# Patient Record
Sex: FEMALE | Race: OTHER | HISPANIC OR LATINO | Employment: UNEMPLOYED | URBAN - METROPOLITAN AREA
[De-identification: names, ages, dates, MRNs, and addresses within clinical notes are randomized per-mention and may not be internally consistent; named-entity substitution may affect disease eponyms.]

---

## 2020-09-05 ENCOUNTER — HOSPITAL ENCOUNTER (EMERGENCY)
Facility: HOSPITAL | Age: 25
Discharge: HOME | End: 2020-09-05
Attending: EMERGENCY MEDICINE | Admitting: EMERGENCY MEDICINE

## 2020-09-05 VITALS
SYSTOLIC BLOOD PRESSURE: 114 MMHG | OXYGEN SATURATION: 99 % | DIASTOLIC BLOOD PRESSURE: 80 MMHG | TEMPERATURE: 98.7 F | RESPIRATION RATE: 20 BRPM | HEART RATE: 102 BPM
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
## 2020-09-05 DIAGNOSIS — N73.9 PELVIC INFLAMMATORY DISEASE: Primary | ICD-10-CM
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
## 2020-09-05 LAB
BILIRUB UR QL STRIP: NEGATIVE
CLARITY UR: CLEAR
COLOR UR: YELLOW
EXT PREG TEST URINE: NEGATIVE
EXT. CONTROL ED NAV: NORMAL
GLUCOSE UR STRIP-MCNC: NEGATIVE MG/DL
HGB UR QL STRIP.AUTO: NEGATIVE
KETONES UR STRIP-MCNC: NEGATIVE MG/DL
LEUKOCYTE ESTERASE UR QL STRIP: NEGATIVE
NITRITE UR QL STRIP: NEGATIVE
PH UR STRIP.AUTO: 6 [PH]
PROT UR STRIP-MCNC: NEGATIVE MG/DL
SP GR UR STRIP.AUTO: 1.02 (ref 1–1.04)
UROBILINOGEN UA: NEGATIVE MG/DL

## 2020-09-05 PROCEDURE — 99284 EMERGENCY DEPT VISIT MOD MDM: CPT

## 2020-09-05 PROCEDURE — 81025 URINE PREGNANCY TEST: CPT | Performed by: PHYSICIAN ASSISTANT

## 2020-09-05 PROCEDURE — 87591 N.GONORRHOEAE DNA AMP PROB: CPT | Performed by: PHYSICIAN ASSISTANT

## 2020-09-05 PROCEDURE — 87491 CHLMYD TRACH DNA AMP PROBE: CPT | Performed by: PHYSICIAN ASSISTANT

## 2020-09-05 PROCEDURE — 99284 EMERGENCY DEPT VISIT MOD MDM: CPT | Performed by: PHYSICIAN ASSISTANT

## 2020-09-05 PROCEDURE — 81003 URINALYSIS AUTO W/O SCOPE: CPT | Performed by: PHYSICIAN ASSISTANT

## 2020-09-05 RX ORDER — AMOXICILLIN AND CLAVULANATE POTASSIUM 875; 125 MG/1; MG/1
1 TABLET, FILM COATED ORAL ONCE
Status: COMPLETED | OUTPATIENT
Start: 2020-09-05 | End: 2020-09-05

## 2020-09-05 RX ORDER — GINSENG 100 MG
1 CAPSULE ORAL ONCE
Status: COMPLETED | OUTPATIENT
Start: 2020-09-05 | End: 2020-09-05

## 2020-09-05 RX ADMIN — BACITRACIN ZINC 1 LARGE APPLICATION: 500 OINTMENT TOPICAL at 19:07

## 2020-09-05 RX ADMIN — AMOXICILLIN AND CLAVULANATE POTASSIUM 1 TABLET: 875; 125 TABLET, FILM COATED ORAL at 19:07

## 2020-09-05 NOTE — ED NOTES
Pt rodolfo from the ER  She stated, "I need to get home to my kids"         Renee Aguayo, RN  09/05/20 8130
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
## 2020-09-07 LAB
C TRACH DNA SPEC QL NAA+PROBE: NEGATIVE
N GONORRHOEA DNA SPEC QL NAA+PROBE: NEGATIVE

## 2021-06-08 PROBLEM — Z00.00 ENCOUNTER FOR PREVENTIVE HEALTH EXAMINATION: Status: ACTIVE | Noted: 2021-06-08

## 2021-06-16 ENCOUNTER — APPOINTMENT (OUTPATIENT)
Dept: INTERNAL MEDICINE | Facility: CLINIC | Age: 26
End: 2021-06-16

## 2021-08-16 ENCOUNTER — HOSPITAL ENCOUNTER (EMERGENCY)
Facility: HOSPITAL | Age: 26
Discharge: HOME | End: 2021-08-16
Attending: EMERGENCY MEDICINE
Payer: COMMERCIAL

## 2021-08-16 VITALS
OXYGEN SATURATION: 99 % | SYSTOLIC BLOOD PRESSURE: 108 MMHG | DIASTOLIC BLOOD PRESSURE: 51 MMHG | HEART RATE: 74 BPM | TEMPERATURE: 98.1 F | RESPIRATION RATE: 18 BRPM | WEIGHT: 142.64 LBS

## 2021-08-16 DIAGNOSIS — R19.7 DIARRHEA: ICD-10-CM

## 2021-08-16 DIAGNOSIS — Z34.90 PREGNANCY: ICD-10-CM

## 2021-08-16 DIAGNOSIS — R11.2 NAUSEA AND VOMITING: Primary | ICD-10-CM

## 2021-08-16 LAB
ALBUMIN SERPL BCP-MCNC: 3.9 G/DL (ref 3–5.2)
ALP SERPL-CCNC: 39 U/L (ref 43–122)
ALT SERPL W P-5'-P-CCNC: 11 U/L
ANION GAP SERPL CALCULATED.3IONS-SCNC: 10 MMOL/L (ref 5–14)
AST SERPL W P-5'-P-CCNC: 23 U/L (ref 14–36)
BASOPHILS # BLD AUTO: 0 THOUSANDS/ΜL (ref 0–0.1)
BASOPHILS NFR BLD AUTO: 0 % (ref 0–1)
BILIRUB SERPL-MCNC: 0.46 MG/DL
BILIRUB UR QL STRIP: NEGATIVE
BUN SERPL-MCNC: 11 MG/DL (ref 5–25)
CALCIUM SERPL-MCNC: 9 MG/DL (ref 8.4–10.2)
CHLORIDE SERPL-SCNC: 107 MMOL/L (ref 97–108)
CLARITY UR: ABNORMAL
CO2 SERPL-SCNC: 20 MMOL/L (ref 22–30)
COLOR UR: ABNORMAL
CREAT SERPL-MCNC: 0.61 MG/DL (ref 0.6–1.2)
EOSINOPHIL # BLD AUTO: 0.2 THOUSAND/ΜL (ref 0–0.4)
EOSINOPHIL NFR BLD AUTO: 2 % (ref 0–6)
ERYTHROCYTE [DISTWIDTH] IN BLOOD BY AUTOMATED COUNT: 12.7 %
EXT PREG TEST URINE: POSITIVE
EXT. CONTROL ED NAV: ABNORMAL
GFR SERPL CREATININE-BSD FRML MDRD: 128 ML/MIN/1.73SQ M
GLUCOSE SERPL-MCNC: 107 MG/DL (ref 70–99)
GLUCOSE UR STRIP-MCNC: NEGATIVE MG/DL
HCT VFR BLD AUTO: 37 % (ref 36–46)
HGB BLD-MCNC: 12.7 G/DL (ref 12–16)
HGB UR QL STRIP.AUTO: NEGATIVE
KETONES UR STRIP-MCNC: NEGATIVE MG/DL
LEUKOCYTE ESTERASE UR QL STRIP: NEGATIVE
LYMPHOCYTES # BLD AUTO: 1.7 THOUSANDS/ΜL (ref 0.5–4)
LYMPHOCYTES NFR BLD AUTO: 18 % (ref 25–45)
MCH RBC QN AUTO: 30.8 PG (ref 26–34)
MCHC RBC AUTO-ENTMCNC: 34.4 G/DL (ref 31–36)
MCV RBC AUTO: 90 FL (ref 80–100)
MONOCYTES # BLD AUTO: 0.5 THOUSAND/ΜL (ref 0.2–0.9)
MONOCYTES NFR BLD AUTO: 6 % (ref 1–10)
NEUTROPHILS # BLD AUTO: 6.7 THOUSANDS/ΜL (ref 1.8–7.8)
NEUTS SEG NFR BLD AUTO: 74 % (ref 45–65)
NITRITE UR QL STRIP: NEGATIVE
PH UR STRIP.AUTO: 5 [PH]
PLATELET # BLD AUTO: 282 THOUSANDS/UL (ref 150–450)
PMV BLD AUTO: 7.8 FL (ref 8.9–12.7)
POTASSIUM SERPL-SCNC: 4 MMOL/L (ref 3.6–5)
PROT SERPL-MCNC: 6.6 G/DL (ref 5.9–8.4)
PROT UR STRIP-MCNC: NEGATIVE MG/DL
RBC # BLD AUTO: 4.13 MILLION/UL (ref 4–5.2)
SODIUM SERPL-SCNC: 137 MMOL/L (ref 137–147)
SP GR UR STRIP.AUTO: 1.02 (ref 1–1.04)
UROBILINOGEN UA: 1 MG/DL
WBC # BLD AUTO: 9 THOUSAND/UL (ref 4.5–11)

## 2021-08-16 PROCEDURE — 36415 COLL VENOUS BLD VENIPUNCTURE: CPT | Performed by: EMERGENCY MEDICINE

## 2021-08-16 PROCEDURE — 99282 EMERGENCY DEPT VISIT SF MDM: CPT | Performed by: EMERGENCY MEDICINE

## 2021-08-16 PROCEDURE — 80053 COMPREHEN METABOLIC PANEL: CPT | Performed by: EMERGENCY MEDICINE

## 2021-08-16 PROCEDURE — 85025 COMPLETE CBC W/AUTO DIFF WBC: CPT | Performed by: EMERGENCY MEDICINE

## 2021-08-16 PROCEDURE — 81025 URINE PREGNANCY TEST: CPT | Performed by: EMERGENCY MEDICINE

## 2021-08-16 PROCEDURE — 99284 EMERGENCY DEPT VISIT MOD MDM: CPT

## 2021-08-16 RX ORDER — ONDANSETRON 2 MG/ML
4 INJECTION INTRAMUSCULAR; INTRAVENOUS ONCE
Status: DISCONTINUED | OUTPATIENT
Start: 2021-08-16 | End: 2021-08-16 | Stop reason: HOSPADM

## 2021-08-16 RX ORDER — DICYCLOMINE HCL 20 MG
20 TABLET ORAL ONCE
Status: DISCONTINUED | OUTPATIENT
Start: 2021-08-16 | End: 2021-08-16 | Stop reason: HOSPADM

## 2021-08-16 RX ORDER — ONDANSETRON 4 MG/1
4 TABLET, ORALLY DISINTEGRATING ORAL EVERY 8 HOURS PRN
Qty: 10 TABLET | Refills: 0 | Status: SHIPPED | OUTPATIENT
Start: 2021-08-16 | End: 2021-08-21 | Stop reason: ALTCHOICE

## 2021-08-16 NOTE — ED PROVIDER NOTES
History  Chief Complaint   Patient presents with    Abdominal Pain     Pt  reports diarrhea, nausea, and vomiting x3 days  Pt  states generalized abdominal pain  51-year-old female presents with a 2-3 day history of nausea, vomiting, diarrhea  She complains of diffuse abdominal cramping  Her daughter has had similar symptoms  She has had decreased p o  Intake but is able to tolerate some fluids  She denies any fevers or chills or focality to her abdominal pain  She denies any other acute issues at this point time  Abdominal Pain  Pain location:  Generalized  Pain quality: aching and cramping    Pain radiates to:  Does not radiate  Pain severity:  Mild  Onset quality:  Gradual  Duration:  3 days  Timing:  Constant  Progression:  Waxing and waning  Chronicity:  New  Relieved by:  Nothing  Worsened by:  Nothing  Ineffective treatments:  None tried  Associated symptoms: anorexia, diarrhea, fatigue, nausea and vomiting    Associated symptoms: no belching, no chest pain, no chills, no constipation, no cough, no dysuria, no fever, no flatus, no hematemesis, no hematochezia, no hematuria, no melena, no shortness of breath, no sore throat, no vaginal bleeding and no vaginal discharge        None       History reviewed  No pertinent past medical history  Past Surgical History:   Procedure Laterality Date     SECTION      2 c-sections       History reviewed  No pertinent family history  I have reviewed and agree with the history as documented  E-Cigarette/Vaping    E-Cigarette Use Never User      E-Cigarette/Vaping Substances    Nicotine No     THC No     CBD No     Flavoring No     Other No     Unknown No      Social History     Tobacco Use    Smoking status: Never Smoker    Smokeless tobacco: Never Used   Vaping Use    Vaping Use: Never used   Substance Use Topics    Alcohol use: Never    Drug use: Never       Review of Systems   Constitutional: Positive for fatigue   Negative for appetite change, chills and fever  HENT: Negative for postnasal drip, sinus pain, sore throat and trouble swallowing  Eyes: Negative for redness and itching  Respiratory: Negative for cough, chest tightness, shortness of breath and wheezing  Cardiovascular: Negative for chest pain and leg swelling  Gastrointestinal: Positive for abdominal pain, anorexia, diarrhea, nausea and vomiting  Negative for constipation, flatus, hematemesis, hematochezia and melena  Endocrine: Negative  Genitourinary: Negative for difficulty urinating, dysuria, hematuria, vaginal bleeding and vaginal discharge  Musculoskeletal: Negative for back pain and myalgias  Skin: Negative for rash  Allergic/Immunologic: Negative  Neurological: Negative for dizziness, numbness and headaches  Hematological: Negative  Psychiatric/Behavioral: Negative  All other systems reviewed and are negative  Physical Exam  Physical Exam  Vitals and nursing note reviewed  Constitutional:       General: She is not in acute distress  Appearance: Normal appearance  She is well-developed  She is not ill-appearing, toxic-appearing or diaphoretic  HENT:      Head: Normocephalic and atraumatic  Right Ear: External ear normal       Left Ear: External ear normal       Nose: Nose normal  No congestion  Mouth/Throat:      Mouth: Mucous membranes are moist       Pharynx: Oropharynx is clear  Eyes:      Conjunctiva/sclera: Conjunctivae normal       Pupils: Pupils are equal, round, and reactive to light  Cardiovascular:      Rate and Rhythm: Normal rate and regular rhythm  Heart sounds: Normal heart sounds  Pulmonary:      Effort: Pulmonary effort is normal  No respiratory distress  Breath sounds: Normal breath sounds  No wheezing  Abdominal:      General: Bowel sounds are normal       Palpations: Abdomen is soft  Tenderness: There is generalized abdominal tenderness (mild)   There is no guarding or rebound  Negative signs include Harvey's sign and McBurney's sign  Musculoskeletal:         General: No tenderness or deformity  Cervical back: Normal range of motion and neck supple  No rigidity  Skin:     General: Skin is warm and dry  Capillary Refill: Capillary refill takes less than 2 seconds  Findings: No rash  Neurological:      General: No focal deficit present  Mental Status: She is alert and oriented to person, place, and time     Psychiatric:         Mood and Affect: Mood normal          Behavior: Behavior normal          Vital Signs  ED Triage Vitals   Temperature Pulse Respirations Blood Pressure SpO2   08/16/21 1449 08/16/21 1450 08/16/21 1450 08/16/21 1452 08/16/21 1450   98 1 °F (36 7 °C) 74 18 108/51 99 %      Temp Source Heart Rate Source Patient Position - Orthostatic VS BP Location FiO2 (%)   08/16/21 1449 08/16/21 1450 08/16/21 1452 08/16/21 1452 --   Oral Monitor Sitting Left arm       Pain Score       --                  Vitals:    08/16/21 1450 08/16/21 1452   BP:  108/51   Pulse: 74    Patient Position - Orthostatic VS:  Sitting         Visual Acuity      ED Medications  Medications   sodium chloride 0 9 % bolus 1,000 mL (has no administration in time range)   ondansetron (ZOFRAN) injection 4 mg (has no administration in time range)   dicyclomine (BENTYL) tablet 20 mg (has no administration in time range)       Diagnostic Studies  Results Reviewed     Procedure Component Value Units Date/Time    UA (URINE) with reflex to Scope [[id number]]  (Abnormal) Collected: 08/16/21 1630    Lab Status: Final result Specimen: Urine, Clean Catch Updated: 08/16/21 1644     Color, UA Madeline     Clarity, UA Slightly Cloudy     Specific Gravity, UA 1 025     pH, UA 5 0     Leukocytes, UA Negative     Nitrite, UA Negative     Protein, UA Negative mg/dl      Glucose, UA Negative mg/dl      Ketones, UA Negative mg/dl      Bilirubin, UA Negative     Blood, UA Negative     UROBILINOGEN UA 1 0 mg/dL     POCT pregnancy, urine [[id number]]  (Abnormal) Resulted: 08/16/21 1631    Lab Status: Final result Updated: 08/16/21 1632     EXT PREG TEST UR (Ref: Negative) positive     Control valid    Comprehensive metabolic panel [[id number]]  (Abnormal) Collected: 08/16/21 1558    Lab Status: Final result Specimen: Blood from Arm, Right Updated: 08/16/21 1618     Sodium 137 mmol/L      Potassium 4 0 mmol/L      Chloride 107 mmol/L      CO2 20 mmol/L      ANION GAP 10 mmol/L      BUN 11 mg/dL      Creatinine 0 61 mg/dL      Glucose 107 mg/dL      Calcium 9 0 mg/dL      AST 23 U/L      ALT 11 U/L      Alkaline Phosphatase 39 U/L      Total Protein 6 6 g/dL      Albumin 3 9 g/dL      Total Bilirubin 0 46 mg/dL      eGFR 128 ml/min/1 73sq m     Narrative:      National Kid[employer] guidelines for Chronic Kidney Disease (CKD):     Stage 1 with normal or high GFR (GFR > 90 mL/min/1 73 square meters)    Stage 2 Mild CKD (GFR = 60-89 mL/min/1 73 square meters)    Stage 3A Moderate CKD (GFR = 45-59 mL/min/1 73 square meters)    Stage 3B Moderate CKD (GFR = 30-44 mL/min/1 73 square meters)    Stage 4 Severe CKD (GFR = 15-29 mL/min/1 73 square meters)    Stage 5 End Stage CKD (GFR <15 mL/min/1 73 square meters)  Note: GFR calculation is accurate only with a steady state creatinine    CBC and differential [[id number]]  (Abnormal) Collected: 08/16/21 1558    Lab Status: Final result Specimen: Blood from Arm, Right Updated: 08/16/21 1606     WBC 9 00 Thousand/uL      RBC 4 13 Million/uL      Hemoglobin 12 7 g/dL      Hematocrit 37 0 %      MCV 90 fL      MCH 30 8 pg      MCHC 34 4 g/dL      RDW 12 7 %      MPV 7 8 fL      Platelets 396 Thousands/uL      Neutrophils Relative 74 %      Lymphocytes Relative 18 %      Monocytes Relative 6 %      Eosinophils Relative 2 %      Basophils Relative 0 %      Neutrophils Absolute 6 70 Thousands/µL      Lymphocytes Absolute 1 70 Thousands/µL      Monocytes Absolute 0 50 Thousand/µL      Eosinophils Absolute 0 20 Thousand/µL      Basophils Absolute 0 00 Thousands/µL                  No orders to display              Procedures  Procedures         ED Course                                           MDM  Number of Diagnoses or Management Options  Diarrhea  Nausea and vomiting  Pregnancy  Diagnosis management comments: 63-year-old female presents with complaint of nausea, vomiting, and diarrhea for the past three days  After receiving Zofran and IV fluids, the patient's symptoms improved  She was found to be pregnant  She or he has an obstetrician that she will be following up with  She is aware of supportive care measures, need for follow-up, and reasons to return to the ER  Amount and/or Complexity of Data Reviewed  Clinical lab tests: ordered and reviewed        Disposition  Final diagnoses:   Nausea and vomiting   Diarrhea   Pregnancy     Time reflects when diagnosis was documented in both MDM as applicable and the Disposition within this note     Time User Action Codes Description Comment    8/16/2021  4:44 PM Lauraa [place] Add [R11 2] Nausea and vomiting     8/16/2021  4:44 PM Lauraa [place] Add [R19 7] Diarrhea     8/16/2021  4:44 PM Ry Cross Add [P92 55] Pregnancy       ED Disposition     ED Disposition Condition Date/Time Comment    Discharge Stable Mon Aug 16, 2021  4:45 PM Nick Johnson discharge to home/self care  Follow-up Information     Follow up With Specialties Details Why Contact Info        Follow up with OB as discussed  Patient's Medications   Discharge Prescriptions    ONDANSETRON (ZOFRAN-ODT) 4 MG DISINTEGRATING TABLET    Take 1 tablet (4 mg total) by mouth every 8 (eight) hours as needed for nausea or vomiting       Start Date: 8/16/2021 End Date: --       Order Dose: 4 mg       Quantity: 10 tablet    Refills: 0     No discharge procedures on file      PDMP Review     None          ED Provider  Electronically Signed by           Lottie Quinones,   08/16/21 1088

## 2021-08-21 ENCOUNTER — HOSPITAL ENCOUNTER (EMERGENCY)
Facility: HOSPITAL | Age: 26
Discharge: HOME | End: 2021-08-21
Attending: EMERGENCY MEDICINE
Payer: COMMERCIAL

## 2021-08-21 VITALS
SYSTOLIC BLOOD PRESSURE: 157 MMHG | WEIGHT: 148 LBS | HEART RATE: 95 BPM | TEMPERATURE: 96.8 F | RESPIRATION RATE: 18 BRPM | DIASTOLIC BLOOD PRESSURE: 84 MMHG | OXYGEN SATURATION: 100 %

## 2021-08-21 DIAGNOSIS — O20.0 THREATENED MISCARRIAGE: Primary | ICD-10-CM

## 2021-08-21 LAB
BACTERIA UR QL AUTO: ABNORMAL /HPF
BILIRUB UR QL STRIP: NEGATIVE
CLARITY UR: ABNORMAL
COLOR UR: YELLOW
GLUCOSE UR STRIP-MCNC: NEGATIVE MG/DL
HGB UR QL STRIP.AUTO: NEGATIVE
HYALINE CASTS #/AREA URNS LPF: ABNORMAL /LPF
KETONES UR STRIP-MCNC: NEGATIVE MG/DL
LEUKOCYTE ESTERASE UR QL STRIP: NEGATIVE
NITRITE UR QL STRIP: NEGATIVE
NON-SQ EPI CELLS URNS QL MICRO: ABNORMAL /HPF
PH UR STRIP.AUTO: 5 [PH]
PROT UR STRIP-MCNC: ABNORMAL MG/DL
RBC #/AREA URNS AUTO: ABNORMAL /HPF
SP GR UR STRIP.AUTO: 1.01 (ref 1–1.04)
UROBILINOGEN UA: NEGATIVE MG/DL
WBC #/AREA URNS AUTO: ABNORMAL /HPF

## 2021-08-21 PROCEDURE — 99284 EMERGENCY DEPT VISIT MOD MDM: CPT | Performed by: EMERGENCY MEDICINE

## 2021-08-21 PROCEDURE — 81003 URINALYSIS AUTO W/O SCOPE: CPT | Performed by: EMERGENCY MEDICINE

## 2021-08-21 PROCEDURE — 87086 URINE CULTURE/COLONY COUNT: CPT | Performed by: EMERGENCY MEDICINE

## 2021-08-21 PROCEDURE — 81001 URINALYSIS AUTO W/SCOPE: CPT | Performed by: EMERGENCY MEDICINE

## 2021-08-21 PROCEDURE — 99284 EMERGENCY DEPT VISIT MOD MDM: CPT

## 2021-08-21 RX ORDER — ACETAMINOPHEN 325 MG/1
975 TABLET ORAL ONCE
Status: DISCONTINUED | OUTPATIENT
Start: 2021-08-21 | End: 2021-08-22 | Stop reason: HOSPADM

## 2021-08-22 NOTE — ED PROVIDER NOTES
History  Chief Complaint   Patient presents with    Abdominal Pain Pregnant     Pt is appr  7 weeks pregnant and reports R lower abdominal pain that started around   Pt states the pain continued to get worse, no bleeding or discharge but reports one episode of vomiting  Patient is a 23year old  who presents at approximately 7 weeks pregnancy with acute onset of intermittent crampy lower abdominal pain that does not radiate  Started at 8 PM tonight   +nausea, no vomiting  No diarrhea  No bleeding  No meds taken  No fever,sweats or chills  No vaginal discharge  Patient does admit to urinary frequency  Prior to Admission Medications   Prescriptions Last Dose Informant Patient Reported? Taking? Prenatal w/o A Vit-Fe Fum-FA (PRENATA PO)  Self Yes Yes   Sig: Take 1 tablet by mouth      Facility-Administered Medications: None       History reviewed  No pertinent past medical history  Past Surgical History:   Procedure Laterality Date     SECTION      2 c-sections       History reviewed  No pertinent family history  I have reviewed and agree with the history as documented  E-Cigarette/Vaping    E-Cigarette Use Never User      E-Cigarette/Vaping Substances    Nicotine No     THC No     CBD No     Flavoring No     Other No     Unknown No      Social History     Tobacco Use    Smoking status: Never Smoker    Smokeless tobacco: Never Used   Vaping Use    Vaping Use: Never used   Substance Use Topics    Alcohol use: Never    Drug use: Never       Review of Systems   Constitutional: Negative  HENT: Negative  Eyes: Negative  Respiratory: Negative  Cardiovascular: Negative  Gastrointestinal: Positive for abdominal pain and nausea  Endocrine: Negative  Genitourinary: Negative  Musculoskeletal: Negative  Skin: Negative  Allergic/Immunologic: Negative  Neurological: Negative  Hematological: Negative  Psychiatric/Behavioral: Negative  All other systems reviewed and are negative  Physical Exam  Physical Exam  Vitals and nursing note reviewed  Constitutional:       Appearance: Normal appearance  She is normal weight  HENT:      Head: Normocephalic and atraumatic  Cardiovascular:      Rate and Rhythm: Normal rate and regular rhythm  Pulses: Normal pulses  Heart sounds: Normal heart sounds  Pulmonary:      Effort: Pulmonary effort is normal       Breath sounds: Normal breath sounds  Abdominal:      General: Bowel sounds are normal  There is no distension  Palpations: Abdomen is soft  Tenderness: There is no abdominal tenderness  There is no right CVA tenderness, left CVA tenderness, guarding or rebound  Musculoskeletal:         General: Normal range of motion  Cervical back: Normal range of motion and neck supple  Skin:     General: Skin is warm and dry  Capillary Refill: Capillary refill takes less than 2 seconds  Neurological:      General: No focal deficit present  Mental Status: She is alert and oriented to person, place, and time     Psychiatric:         Mood and Affect: Mood normal          Behavior: Behavior normal          Vital Signs  ED Triage Vitals [08/21/21 2219]   Temperature Pulse Respirations Blood Pressure SpO2   (!) 96 8 °F (36 °C) 95 18 157/84 100 %      Temp Source Heart Rate Source Patient Position - Orthostatic VS BP Location FiO2 (%)   Tympanic Monitor Sitting Left arm --      Pain Score       Worst Possible Pain           Vitals:    08/21/21 2219   BP: 157/84   Pulse: 95   Patient Position - Orthostatic VS: Sitting         Visual Acuity      ED Medications  Medications   acetaminophen (TYLENOL) tablet 975 mg (has no administration in time range)       Diagnostic Studies  Results Reviewed     Procedure Component Value Units Date/Time    UA w Reflex to Microscopic w Reflex to Culture [[id number]]  (Abnormal) Collected: 08/21/21 2240    Lab Status: Final result Specimen: Urine, Clean Catch Updated: 08/21/21 2248     Color, UA Yellow     Clarity, UA Slightly Cloudy     Specific Gravity, UA 1 015     pH, UA 5 0     Leukocytes, UA Negative     Nitrite, UA Negative     Protein, UA 30 (1+) mg/dl      Glucose, UA Negative mg/dl      Ketones, UA Negative mg/dl      Bilirubin, UA Negative     Blood, UA Negative     URINE COMMENT --     UROBILINOGEN UA Negative mg/dL     Urine culture [[id number]] Collected: 08/21/21 2240    Lab Status: In process Specimen: Urine, Clean Catch Updated: 08/21/21 2248    Urine Microscopic [[id number]] Collected: 08/21/21 2240    Lab Status: In process Specimen: Urine, Clean Catch Updated: 08/21/21 2247                 No orders to display              Procedures  Procedures         ED Course  ED Course as of Aug 21 2253   Sat Aug 21, 2021   2218 Bedside US done by me  Early IUP vs blighted ovum  Earlier than stated dates  SBIRT 22yo+      Most Recent Value   SBIRT (24 yo +)   In order to provide better care to our patients, we are screening all of our patients for alcohol and drug use  Would it be okay to ask you these screening questions? No Filed at: 08/21/2021 2240                    MDM  Number of Diagnoses or Management Options     Amount and/or Complexity of Data Reviewed  Clinical lab tests: ordered and reviewed  Review and summarize past medical records: yes  Independent visualization of images, tracings, or specimens: yes        Disposition  Final diagnoses:   Threatened miscarriage     Time reflects when diagnosis was documented in both MDM as applicable and the Disposition within this note     Time User Action Codes Description Comment    8/21/2021 10:53 PM Grace Kim Add [O20 0] Threatened miscarriage       ED Disposition     ED Disposition Condition Date/Time Comment    Discharge Stable Sat Aug 21, 2021 10:53 PM Damaris Pagan discharge to home/self care              Follow-up Information     Follow up With Specialties Details Why Rosalba Worrell [facility]   [facility]  [place] [address] [place] [address]  [phone]            Patient's Medications   Discharge Prescriptions    No medications on file     No discharge procedures on file      PDMP Review     None          ED Provider  Electronically Signed by           Cora Garcia MD  08/21/21 0418

## 2021-08-24 LAB — BACTERIA UR CULT: NORMAL

## 2022-09-09 ENCOUNTER — HOSPITAL ENCOUNTER (EMERGENCY)
Facility: HOSPITAL | Age: 27
Discharge: HOME | End: 2022-09-09
Attending: EMERGENCY MEDICINE
Payer: COMMERCIAL

## 2022-09-09 ENCOUNTER — APPOINTMENT (OUTPATIENT)
Dept: ULTRASOUND IMAGING | Facility: HOSPITAL | Age: 27
End: 2022-09-09
Payer: COMMERCIAL

## 2022-09-09 VITALS
TEMPERATURE: 98.3 F | WEIGHT: 172.3 LBS | SYSTOLIC BLOOD PRESSURE: 108 MMHG | DIASTOLIC BLOOD PRESSURE: 53 MMHG | HEART RATE: 81 BPM | OXYGEN SATURATION: 99 % | RESPIRATION RATE: 16 BRPM

## 2022-09-09 DIAGNOSIS — N93.9 ABNORMAL UTERINE BLEEDING: Primary | ICD-10-CM

## 2022-09-09 LAB
ALBUMIN SERPL BCP-MCNC: 3.8 G/DL (ref 3.5–5)
ALP SERPL-CCNC: 68 U/L (ref 43–122)
ALT SERPL W P-5'-P-CCNC: 18 U/L
ANION GAP SERPL CALCULATED.3IONS-SCNC: 4 MMOL/L (ref 5–14)
AST SERPL W P-5'-P-CCNC: 23 U/L (ref 14–36)
B-HCG SERPL-ACNC: <3 MIU/ML
BACTERIA UR QL AUTO: ABNORMAL /HPF
BASOPHILS # BLD AUTO: 0.02 THOUSANDS/ΜL (ref 0–0.1)
BASOPHILS NFR BLD AUTO: 0 % (ref 0–1)
BILIRUB SERPL-MCNC: 0.2 MG/DL (ref 0.2–1)
BILIRUB UR QL STRIP: NEGATIVE
BUN SERPL-MCNC: 11 MG/DL (ref 5–25)
CALCIUM SERPL-MCNC: 8.9 MG/DL (ref 8.4–10.2)
CHLORIDE SERPL-SCNC: 105 MMOL/L (ref 96–108)
CLARITY UR: ABNORMAL
CO2 SERPL-SCNC: 28 MMOL/L (ref 21–32)
COLOR UR: YELLOW
CREAT SERPL-MCNC: 0.72 MG/DL (ref 0.6–1.2)
EOSINOPHIL # BLD AUTO: 0.21 THOUSAND/ΜL (ref 0–0.61)
EOSINOPHIL NFR BLD AUTO: 4 % (ref 0–6)
ERYTHROCYTE [DISTWIDTH] IN BLOOD BY AUTOMATED COUNT: 12.8 % (ref 11.6–15.1)
GFR SERPL CREATININE-BSD FRML MDRD: 116 ML/MIN/1.73SQ M
GLUCOSE SERPL-MCNC: 112 MG/DL (ref 70–99)
GLUCOSE UR STRIP-MCNC: NEGATIVE MG/DL
HCT VFR BLD AUTO: 38.4 % (ref 34.8–46.1)
HGB BLD-MCNC: 12.5 G/DL (ref 11.5–15.4)
HGB UR QL STRIP.AUTO: 250
IMM GRANULOCYTES # BLD AUTO: 0.02 THOUSAND/UL (ref 0–0.2)
IMM GRANULOCYTES NFR BLD AUTO: 0 % (ref 0–2)
KETONES UR STRIP-MCNC: NEGATIVE MG/DL
LEUKOCYTE ESTERASE UR QL STRIP: NEGATIVE
LYMPHOCYTES # BLD AUTO: 1.52 THOUSANDS/ΜL (ref 0.6–4.47)
LYMPHOCYTES NFR BLD AUTO: 27 % (ref 14–44)
MCH RBC QN AUTO: 29.2 PG (ref 26.8–34.3)
MCHC RBC AUTO-ENTMCNC: 32.6 G/DL (ref 31.4–37.4)
MCV RBC AUTO: 90 FL (ref 82–98)
MONOCYTES # BLD AUTO: 0.26 THOUSAND/ΜL (ref 0.17–1.22)
MONOCYTES NFR BLD AUTO: 5 % (ref 4–12)
MUCOUS THREADS UR QL AUTO: ABNORMAL
NEUTROPHILS # BLD AUTO: 3.53 THOUSANDS/ΜL (ref 1.85–7.62)
NEUTS SEG NFR BLD AUTO: 64 % (ref 43–75)
NITRITE UR QL STRIP: NEGATIVE
NON-SQ EPI CELLS URNS QL MICRO: ABNORMAL /HPF
NRBC BLD AUTO-RTO: 0 /100 WBCS
PH UR STRIP.AUTO: 8 [PH]
PLATELET # BLD AUTO: 250 THOUSANDS/UL (ref 149–390)
PMV BLD AUTO: 8.9 FL (ref 8.9–12.7)
POTASSIUM SERPL-SCNC: 4.1 MMOL/L (ref 3.5–5.3)
PROT SERPL-MCNC: 6.9 G/DL (ref 6.4–8.4)
PROT UR STRIP-MCNC: ABNORMAL MG/DL
RBC # BLD AUTO: 4.28 MILLION/UL (ref 3.81–5.12)
RBC #/AREA URNS AUTO: ABNORMAL /HPF
SODIUM SERPL-SCNC: 137 MMOL/L (ref 135–147)
SP GR UR STRIP.AUTO: 1.02 (ref 1–1.04)
UROBILINOGEN UA: NEGATIVE MG/DL
WBC # BLD AUTO: 5.56 THOUSAND/UL (ref 4.31–10.16)
WBC #/AREA URNS AUTO: ABNORMAL /HPF

## 2022-09-09 PROCEDURE — 99284 EMERGENCY DEPT VISIT MOD MDM: CPT

## 2022-09-09 PROCEDURE — 81001 URINALYSIS AUTO W/SCOPE: CPT

## 2022-09-09 PROCEDURE — 81003 URINALYSIS AUTO W/O SCOPE: CPT

## 2022-09-09 PROCEDURE — 76830 TRANSVAGINAL US NON-OB: CPT

## 2022-09-09 PROCEDURE — 36415 COLL VENOUS BLD VENIPUNCTURE: CPT

## 2022-09-09 PROCEDURE — 76856 US EXAM PELVIC COMPLETE: CPT

## 2022-09-09 PROCEDURE — 85025 COMPLETE CBC W/AUTO DIFF WBC: CPT

## 2022-09-09 PROCEDURE — 80053 COMPREHEN METABOLIC PANEL: CPT

## 2022-09-09 PROCEDURE — 84702 CHORIONIC GONADOTROPIN TEST: CPT

## 2022-09-09 NOTE — Clinical Note
Mitch Hirsch was seen and treated in our emergency department on 9/9/2022  No restrictions            Diagnosis:     Margaux Mediate    She may return on this date: If you have any questions or concerns, please don't hesitate to call        Shiloh Greenwood PA-C    ______________________________           _______________          _______________  Hospital Representative                              Date                                Time

## 2022-09-09 NOTE — ED PROVIDER NOTES
History  Chief Complaint   Patient presents with    Vaginal Pain     Pt reports heavy bleeding and red urine, lower back pain, and cramping but had period last week  Patient is a 82-year-old female coming in for heavy vaginal bleeding for the last 6 days  Patient states that her normal period ended 4 days before the bleeding again  Patient proximally 4 months ago gave vaginal birth with no complications  Patient denies any other symptoms at this time  Patient denies any inciting event  Patient is going through multitude of pads throughout the day      History provided by:  Patient   used: No    Vaginal Pain  Severity:  Moderate  Duration:  6 days  Chronicity:  New  Associated symptoms: no abdominal pain, no cough, no ear pain, no fatigue, no fever, no headaches, no nausea, no shortness of breath and no vomiting        Prior to Admission Medications   Prescriptions Last Dose Informant Patient Reported? Taking? Prenatal w/o A Vit-Fe Fum-FA (PRENATA PO)  Self Yes No   Sig: Take 1 tablet by mouth      Facility-Administered Medications: None       History reviewed  No pertinent past medical history  Past Surgical History:   Procedure Laterality Date     SECTION      2 c-sections       History reviewed  No pertinent family history  I have reviewed and agree with the history as documented  E-Cigarette/Vaping    E-Cigarette Use Never User      E-Cigarette/Vaping Substances    Nicotine No     THC No     CBD No     Flavoring No     Other No     Unknown No      Social History     Tobacco Use    Smoking status: Current Every Day Smoker     Packs/day: 0 25    Smokeless tobacco: Never Used   Vaping Use    Vaping Use: Never used   Substance Use Topics    Alcohol use: Never    Drug use: Yes     Types: Marijuana     Comment: Pt uses at night to sleep       Review of Systems   Constitutional: Negative  Negative for fatigue and fever  HENT: Negative    Negative for ear pain     Eyes: Negative  Respiratory: Negative  Negative for cough and shortness of breath  Cardiovascular: Negative  Gastrointestinal: Negative  Negative for abdominal pain, nausea and vomiting  Genitourinary: Positive for vaginal bleeding and vaginal pain  Negative for decreased urine volume and vaginal discharge  Musculoskeletal: Negative  Skin: Negative  Neurological: Negative for headaches  Psychiatric/Behavioral: Negative  Physical Exam  Physical Exam  Vitals reviewed  Constitutional:       Appearance: Normal appearance  She is normal weight  HENT:      Head: Normocephalic and atraumatic  Right Ear: External ear normal       Left Ear: External ear normal       Nose: Nose normal    Eyes:      Conjunctiva/sclera: Conjunctivae normal    Cardiovascular:      Rate and Rhythm: Normal rate  Pulmonary:      Effort: Pulmonary effort is normal    Abdominal:      Palpations: Abdomen is soft  Tenderness: There is no abdominal tenderness  There is no guarding  Musculoskeletal:         General: Normal range of motion  Cervical back: Normal range of motion  Skin:     General: Skin is warm and dry  Neurological:      Mental Status: She is alert           Vital Signs  ED Triage Vitals [09/09/22 1616]   Temperature Pulse Respirations Blood Pressure SpO2   98 3 °F (36 8 °C) 81 16 108/53 99 %      Temp Source Heart Rate Source Patient Position - Orthostatic VS BP Location FiO2 (%)   Oral Monitor Sitting Left arm --      Pain Score       --           Vitals:    09/09/22 1616   BP: 108/53   Pulse: 81   Patient Position - Orthostatic VS: Sitting         Visual Acuity      ED Medications  Medications - No data to display    Diagnostic Studies  Results Reviewed     Procedure Component Value Units Date/Time    Urine Microscopic [[id number]]  (Abnormal) Collected: 09/09/22 1750    Lab Status: Final result Specimen: Urine, Clean Catch Updated: 09/09/22 1809     RBC, UA 20-30 /hpf WBC, UA None Seen /hpf      Epithelial Cells Occasional /hpf      Bacteria, UA Occasional /hpf      MUCUS THREADS Occasional    hCG, quantitative [[id number]]  (Normal) Collected: 09/09/22 1718    Lab Status: Final result Specimen: Blood from Arm, Right Updated: 09/09/22 1804     HCG, Quant <3 mIU/mL     Narrative:       Expected Ranges:     Approximate               Approximate HCG  Gestation age          Concentration ( mIU/mL)  _____________          ______________________   [place] [facility] [place]                      HCG values  0 2-1                       5-50  1-2                           2-3                         100-5000  3-4                         500-55552  4-5                         1000-69116  5-6                         [id number]  6-8                         [id number]  8-12                        [id number]      UA w Reflex to Microscopic w Reflex to Culture [[id number]]  (Abnormal) Collected: 09/09/22 1750    Lab Status: Final result Specimen: Urine, Clean Catch Updated: 09/09/22 1803     Color, UA Yellow     Clarity, UA Cloudy     Specific [place], UA 1 020     pH, UA 8 0     Leukocytes, UA Negative     Nitrite, UA Negative     Protein, UA 15 (Trace) mg/dl      Glucose, UA Negative mg/dl      Ketones, UA Negative mg/dl      Bilirubin, UA Negative     Occult Blood,  0     UROBILINOGEN UA Negative mg/dL     Comprehensive metabolic panel [[id number]]  (Abnormal) Collected: 09/09/22 1718    Lab Status: Final result Specimen: Blood from Arm, Right Updated: 09/09/22 1748     Sodium 137 mmol/L      Potassium 4 1 mmol/L      Chloride 105 mmol/L      CO2 28 mmol/L      ANION GAP 4 mmol/L      BUN 11 mg/dL      Creatinine 0 72 mg/dL      Glucose 112 mg/dL      Calcium 8 9 mg/dL      AST 23 U/L      ALT 18 U/L      Alkaline Phosphatase 68 U/L      Total Protein 6 9 g/dL      Albumin 3 8 g/dL      Total Bilirubin 0 20 mg/dL      eGFR 116 ml/min/1 73sq m     Narrative:      [facility]uzi guidelines for Chronic Kidney Disease (CKD):     Stage 1 with normal or high GFR (GFR > 90 mL/min/1 73 square meters)    Stage 2 Mild CKD (GFR = 60-89 mL/min/1 73 square meters)    Stage 3A Moderate CKD (GFR = 45-59 mL/min/1 73 square meters)    Stage 3B Moderate CKD (GFR = 30-44 mL/min/1 73 square meters)    Stage 4 Severe CKD (GFR = 15-29 mL/min/1 73 square meters)    Stage 5 End Stage CKD (GFR <15 mL/min/1 73 square meters)  Note: GFR calculation is accurate only with a steady state creatinine    CBC and differential [[id number]] Collected: 09/09/22 1718    Lab Status: Final result Specimen: Blood from Arm, Right Updated: 09/09/22 1726     WBC 5 56 Thousand/uL      RBC 4 28 Million/uL      Hemoglobin 12 5 g/dL      Hematocrit 38 4 %      MCV 90 fL      MCH 29 2 pg      MCHC 32 6 g/dL      RDW 12 8 %      MPV 8 9 fL      Platelets 633 Thousands/uL      nRBC 0 /100 WBCs      Neutrophils Relative 64 %      Immat GRANS % 0 %      Lymphocytes Relative 27 %      Monocytes Relative 5 %      Eosinophils Relative 4 %      Basophils Relative 0 %      Neutrophils Absolute 3 53 Thousands/µL      Immature Grans Absolute 0 02 Thousand/uL      Lymphocytes Absolute 1 52 Thousands/µL      Monocytes Absolute 0 26 Thousand/µL      Eosinophils Absolute 0 21 Thousand/µL      Basophils Absolute 0 02 Thousands/µL                  US pelvis complete w transvaginal   Final Result by Katlin Julian MD (09/09 1820)       Unremarkable pelvic ultrasound examination  Workstation performed: [id number]                    Procedures  Procedures         ED Course  ED Course as of 09/09/22 1948   Fri Sep 09, 2022   1825 US pelvis complete w transvaginal  Unremarkable pelvic ultrasound examination                                               MDM  Number of Diagnoses or Management Options  Abnormal uterine bleeding: new and requires workup  Diagnosis management comments: Patient's lab work and ultrasound were within normal limits  Patient has no identifiable pathology at this time  I offered a pelvic exam to patient, however she has declined at this time, and will follow-up with her OBGYN next week    Counseling: I had a detailed discussion with the patient and/or guardian regarding: the historical points, exam findings, and any diagnostic results supporting the discharge diagnosis, lab results, radiology results, discharge instructions reviewed with patient and/or family/caregiver and understanding was verbalized  Instructions given to return to the emergency department if symptoms worsen or persist, or if there are any questions or concerns that arise at home       All labs reviewed and utilized in the medical decision making process     All radiology studies independently viewed by me and interpreted by the radiologist     Portions of the record may have been created with voice recognition software   Occasional wrong word or "sound a like" substitutions may have occurred due to the inherent limitations of voice recognition software   Read the chart carefully and recognize, using context, where substitutions have occurred           Amount and/or Complexity of Data Reviewed  Clinical lab tests: ordered and reviewed  Tests in the radiology section of CPT®: ordered and reviewed    Risk of Complications, Morbidity, and/or Mortality  Presenting problems: minimal  Diagnostic procedures: minimal  Management options: minimal    Patient Progress  Patient progress: stable      Disposition  Final diagnoses:   Abnormal uterine bleeding     Time reflects when diagnosis was documented in both MDM as applicable and the Disposition within this note     Time User Action Codes Description Comment    9/9/2022  6:33 PM Shanita Sosa Add [N93 9] Abnormal uterine bleeding       ED Disposition     ED Disposition   Discharge    Condition   Stable    Date/Time   Fri Sep 9, 2022  6:32 PM    Dolly Casper discharge to home/self care                Follow-up Information     Follow up With Specialties Details Why Contact Info Additional [id number] [facility] Emergency Medicine  As needed, If symptoms worsen [id number] [facility] [address] [phone] [address] [facility] Department          Discharge Medication List as of 9/9/2022  6:33 PM      CONTINUE these medications which have NOT CHANGED    Details   Prenatal w/o A Vit-Fe Fum-FA (PRENATA PO) Take 1 tablet by mouth, Historical Med             No discharge procedures on file      PDMP Review     None          ED Provider  Electronically Signed by           Cleopatra Rodriguez PA-C  09/09/22 1948